# Patient Record
Sex: FEMALE | Race: WHITE | NOT HISPANIC OR LATINO | URBAN - METROPOLITAN AREA
[De-identification: names, ages, dates, MRNs, and addresses within clinical notes are randomized per-mention and may not be internally consistent; named-entity substitution may affect disease eponyms.]

---

## 2023-09-22 ENCOUNTER — TELEPHONE (OUTPATIENT)
Dept: PLASTIC SURGERY | Facility: CLINIC | Age: 18
End: 2023-09-22
Payer: COMMERCIAL

## 2023-09-22 NOTE — TELEPHONE ENCOUNTER
[facility] Call Center    Phone Message    May a detailed message be left on voicemail: yes     Reason for Call: Other: Pt called would like to schedule top surgery with Dr Hart, please call - records with [facility] in [place], [place]      Action Taken: Message routed to:  [facility] ([facility] plastic surgery    Travel Screening: Not Applicable

## 2023-10-03 ENCOUNTER — TELEPHONE (OUTPATIENT)
Dept: PLASTIC SURGERY | Facility: CLINIC | Age: 18
End: 2023-10-03
Payer: COMMERCIAL

## 2023-10-03 NOTE — CONFIDENTIAL NOTE
[facility] :  Care Coordination Note     SITUATION   Harsh Short (he/him) is a 18 year old adult who is receiving support for:  Care Team  .    BACKGROUND     Pt is scheduled for a gender affirming mastectomy consult with Dr. Munoz on 3/1/24.     He is also scheduled for a top surgery consult with Dr. Hart on 6/4/24. Jazmin will follow up after each appt.     ASSESSMENT     Surgery              CGC Assessment  Comprehensive Gender Care (CGC) Enrollment: (P) Enrolled  Patient has a therapist: (P) No  Letter of support #1: (P) Requested  Surgery being considered: (P) Yes  Mastectomy: (P) Yes    Pt reports:   No nicotine or other gender affirming surgeries  PLAN          Nursing Interventions:       Follow-up plan:  1. Establish with MH provider  2. Obtain MADHAVI Colunga

## 2024-02-12 ENCOUNTER — TELEPHONE (OUTPATIENT)
Dept: PLASTIC SURGERY | Facility: CLINIC | Age: 19
End: 2024-02-12
Payer: COMMERCIAL

## 2024-02-12 NOTE — TELEPHONE ENCOUNTER
Call placed to patient to discuss upcoming consultation with Dr. Munoz. I encouraged the patient to bring a LOS to this appointment if possible. The patient had questions regarding surgery scheduling, discussed that it is pending LOS approval and decision on who he will move forward with. Patient verbalized understanding and agreeable to plan of care.     The patient was also notified that a link for [employer] was sent as this is the easiest and best way to communicate with our team. Patient will set up account.     Lizzette Madsen RN on 2/12/2024 at 10:12 AM

## 2024-03-01 ENCOUNTER — MYC MEDICAL ADVICE (OUTPATIENT)
Dept: PLASTIC SURGERY | Facility: AMBULATORY SURGERY CENTER | Age: 19
End: 2024-03-01

## 2024-03-01 ENCOUNTER — OFFICE VISIT (OUTPATIENT)
Dept: PLASTIC SURGERY | Facility: AMBULATORY SURGERY CENTER | Age: 19
End: 2024-03-01
Payer: COMMERCIAL

## 2024-03-01 VITALS
SYSTOLIC BLOOD PRESSURE: 126 MMHG | BODY MASS INDEX: 27.31 KG/M2 | HEIGHT: 67 IN | DIASTOLIC BLOOD PRESSURE: 86 MMHG | WEIGHT: 174 LBS

## 2024-03-01 DIAGNOSIS — Z80.3 FAMILY HISTORY OF MALIGNANT NEOPLASM OF BREAST: ICD-10-CM

## 2024-03-01 DIAGNOSIS — F64.9 GENDER DYSPHORIA: Primary | ICD-10-CM

## 2024-03-01 PROCEDURE — 99203 OFFICE O/P NEW LOW 30 MIN: CPT | Performed by: PLASTIC SURGERY

## 2024-03-01 RX ORDER — IBUPROFEN 200 MG
400 TABLET ORAL EVERY 6 HOURS PRN
COMMUNITY

## 2024-03-01 RX ORDER — ACETAMINOPHEN 325 MG/1
1-2 TABLET ORAL EVERY 4 HOURS PRN
COMMUNITY

## 2024-03-01 RX ORDER — TESTOSTERONE CYPIONATE 200 MG/ML
INJECTION, SOLUTION INTRAMUSCULAR WEEKLY
COMMUNITY
Start: 2022-05-06

## 2024-03-01 RX ORDER — ESCITALOPRAM OXALATE 20 MG/1
1 TABLET ORAL DAILY
COMMUNITY
Start: 2020-05-01

## 2024-03-01 NOTE — ADDENDUM NOTE
Addended by: CESAR DOMINGUEZ on: 3/1/2024 10:44 AM     Modules accepted: Orders, Level of Service

## 2024-03-01 NOTE — PROGRESS NOTES
Patient has decided to proceed with me.    I will send case request today.    Daniel Munoz MD , FACS   Diplomate American Board of Plastic Surgery  Diplomate American Board of Surgery  Adj. Assistant Professor of Surgery  Division of Plastic & Reconstructive Surgery   [facility]  Office: [phone]   3/1/2024 at 10:28 AM

## 2024-03-01 NOTE — NURSING NOTE
Patient here today for top surgery consultation.     The patient has a LOS that they will upload once MyChart has been set up.    The patient works at [employer] and will likely need 6 weeks off work to recover.

## 2024-03-01 NOTE — PROGRESS NOTES
"March 1, 2024    Chief complaint:  Gender dysphoria, consultation for top surgery     History of present illness:  This is a 18 year old trans male who comes today for consultation regarding top surgery.  Pronouns he/him.  Patient's name is Harsh.  Patient has been taking testosterone for the last 1-1/2 years.  He has been dealing with gender dysphoria for the last 5 years.  Letter of support is ready.     Past medical history:  History reviewed. No pertinent past medical history.    Gender dysphoria     Past surgical history:  Tonsillectomy     Allergies:  No known drug allergies    Medications:    Current Outpatient Medications:     acetaminophen (TYLENOL) 325 MG tablet, Take 1-2 tablets by mouth every 4 hours as needed for pain, Disp: , Rfl:     escitalopram (LEXAPRO) 20 MG tablet, Take 1 tablet by mouth daily, Disp: , Rfl:     ibuprofen (ADVIL/MOTRIN) 200 MG tablet, Take 400 mg by mouth every 6 hours as needed for pain, Disp: , Rfl:     testosterone cypionate (DEPOTESTOSTERONE) 200 MG/ML injection, Inject into the muscle once a week, Disp: , Rfl:     Family history:  Mother with breast cancer     Social History:  Denies tobacco, drinks alcohol socially     Review of systems:  General ROS: No complaints or constitutional symptoms  Skin: No complaints or symptoms   Hematologic/Lymphatic: No symptoms or complaints  Psychiatric: Patient presents with gender dysphoria  Endocrine: No excessive fatigue, no hypermetabolic symptoms reported  Respiratory ROS: No cough, shortness of breath, or wheezing  Cardiovascular ROS: No chest pain or dyspnea on exertion  Breast ROS: Denies nipple discharge, denies palpable breast masses, denies peau d'orange.  Gastrointestinal ROS: No abdominal pain, nausea, diarrhea, or constipation  Musculoskeletal ROS: No recent injuries reported  Neurological ROS: No focal neurologic defects reported.       Physical exam:    /86   Ht 1.702 m (5' 7\")   Wt 78.9 kg (174 lb)   BMI 27.25 " "kg/m    General: Alert, cooperative, appears stated age   Skin: Skin color, texture, turgor normal, no rashes or lesions   Lymphatic: No obvious adenopathy, no swelling   Eyes: No scleral icterus, pupils equal  HENT: No traumatic injury to the head or face, no gross abnormalities  Lungs: Normal respiratory effort, breath sounds equal bilaterally  Heart: Regular rate and rhythm  Breasts: Bilateral grade 2 ptosis, no obvious nipple inversion, no obvious nipple discharge, no obvious palpable breast masses, no peau d'orange.  No palpable axillae lymphadenopathies bilaterally.  Abdomen: Soft, non-distended and non-tender to palpation  Neurologic: Grossly intact           Assessment:     18 year old trans male with history of gender dysphoria.     PLAN:      I believe that Harsh is a good candidate for gender affirming top surgery with one of the following options: Bilateral simple mastectomies versus bilateral breast reduction with possible nipple graft reconstruction.     Patient desires NO nipple reconstruction.     \"According to [place] case law and [facility] standards of care, with an appropriate letter of support from a mental health provider, top surgery/mastectomy is medically necessary for the treatment of gender dysphoria.\"     I discussed with Harsh the risks of surgery and they include but are not limited to scarring, keloid formation, infection, bleeding, hematoma, seroma, contour deformities, lack of sensation on the chest, loss of nipple areolar graft requiring future tattooing, hypopigmentation of the nipple grafting with potential need for future tattooing.     Patient did acknowledge all of these risks and is interested in top surgery.    He also has an appointment with Dr. Hart and will decide which surgeon to go to after consultation with her.     I will wait for his decision.  In the meantime, I will obtain preoperative bilateral breast ultrasound due to family history of breast cancer.     Time " spent with the patient 30 minutes.    Daniel Munoz MD , FACS   Diplomate American Board of Plastic Surgery  Diplomate American Board of Surgery  Adj. Assistant Professor of Surgery  Division of Plastic & Reconstructive Surgery   [facility]  Office: [phone]   3/1/2024 at 9:37 AM

## 2024-03-07 ENCOUNTER — ANCILLARY PROCEDURE (OUTPATIENT)
Dept: MAMMOGRAPHY | Facility: CLINIC | Age: 19
End: 2024-03-07
Attending: PLASTIC SURGERY
Payer: COMMERCIAL

## 2024-03-07 DIAGNOSIS — Z80.3 FAMILY HISTORY OF MALIGNANT NEOPLASM OF BREAST: ICD-10-CM

## 2024-03-07 PROCEDURE — 76642 ULTRASOUND BREAST LIMITED: CPT | Mod: 50

## 2024-03-10 ENCOUNTER — HEALTH MAINTENANCE LETTER (OUTPATIENT)
Age: 19
End: 2024-03-10

## 2024-03-18 ENCOUNTER — DOCUMENTATION ONLY (OUTPATIENT)
Dept: PLASTIC SURGERY | Facility: CLINIC | Age: 19
End: 2024-03-18
Payer: COMMERCIAL

## 2024-03-18 NOTE — PROGRESS NOTES
[facility] :  Care Coordination Note     SITUATION   Harsh Short (he/him) is a 18 year old adult who is receiving support for:  Care Team  .    BACKGROUND     LOS meets criteria (located in media tab 3/1). Updated pt list. Sent message to RNCC and  to schedule surgery. Sent message to PA coordinator to start PA.    ASSESSMENT     Surgery              CGC Assessment  Comprehensive Gender Care ([facility]) Enrollment: Enrolled  Patient has a therapist: No  Letter of support #1: Received  Letter #1 Date: 02/28/24  Surgery being considered: Yes  Mastectomy: Yes      PLAN          Nursing Interventions:       Follow-up plan:   will schedule surgery. PA coordinator will start PA.        KARTHIK Flor

## 2024-03-20 ENCOUNTER — MYC MEDICAL ADVICE (OUTPATIENT)
Dept: SURGERY | Facility: CLINIC | Age: 19
End: 2024-03-20
Payer: COMMERCIAL

## 2024-03-20 NOTE — LETTER
Pre-op Physical:  Schedule a pre-op physical with your primary care doctor if not internal to [facility].  If internal, we have scheduled this.   The pre-op physical must be 10-30 days before surgery and since it is required by anesthesia, your surgery will be cancelled if it's not done.      Surgery Date: 5/8/2024     [facility].    [address], [address], [place] [address]    Approximate Arrival Time: 5:45 am  (Unless instructed differently by the pre-op call nurse)     Post op Appointment: 5/15/2024 at 10:00 am with  AVNI Crocker  [facility],   [address], [place] [address].    Post op Appointment: 6/10/2024 at 12:30 pm with  Tegan Madrigal NP  [facility].    [address], [place] [address]    Pre-Surgical Tasks:     Review all medications with your primary care or prescribing physician; they will advise you which meds to stop and when, and when you can resume taking.  Certain medications like blood thinners and weight loss medications need to be stopped in advance of surgery to proceed safely.      Blood thinners including but not exclusive to drugs like Xarelto, Eliquis, Warfarin and Aspirin, should be stopped five days before surgery, if your prescribing provider agrees. Follow your provider's advice on stopping blood thinners because they know you best.  If you are unsure if your medication is a blood thinner, ask your prescribing provider.    Weight loss medications: There are multiple medications being used for weight management and diabetes today, and the list is growing.  Phentermine, Ozempic, Wegovy, Trulicity, and other similar medications need to be stopped one week before surgery to avoid being cancelled.  Victoza and Saxenda can be continued longer but must be stopped one full day before  surgery.  Please ask your prescribing provider for advice.    Diabetic medications: in addition to the medications talked about above that are used for either weight loss or diabetes, some people are on insulin that may require adjustment.  Please discuss managing diabetic medications with your prescribing doctor as these medications may require modification prior to surgery.     Please shower the evening before and morning of surgery with Hibiclens soap.  Any [facility] can provide this to you at no cost, or it can be found at your local pharmacy.     Fasting instructions will be provided by the pre-op nurse who will call you 1-3 days before surgery.  Typically, we advise normal food up to 8 hours before you arrive for surgery. Clear liquids only from then until 2 hours before you arrive surgery, then nothing at all by mouth.  The nurse will review your specific instructions with you at the call.      Smoking impacts your body's ability to heal properly so we advise patients to quit if possible before surgery.  Plastic Surgery patients are required to be nicotine free for at least 8 weeks before surgery.      You will need an adult to drive you home and stay with you 24 hours after surgery. Public transportation or Medical Van Services are not permitted.    Visitor restrictions are subject to change, please verify with the pre-op nurse when they call how many people are permitted to accompany you.    We always encourage you to notify your insurance any time you have medical tests or procedures scheduled including surgery. The number is usually right on the back of your insurance card. To obtain pricing for surgery, please call [facility] Cost of Care at [phone] or email [email or web address].        Call our office if you have any questions! Thank you!     Luz Maria Johnson MA  [facility]   (General Surgery/ ENT/ Plastics)  Direct Office:  [phone]

## 2024-04-03 PROBLEM — F64.9 GENDER DYSPHORIA: Status: ACTIVE | Noted: 2024-03-01

## 2024-04-03 NOTE — TELEPHONE ENCOUNTER
Spoke with patient today regarding surgery scheduling      Went over details/instructions.Patient will contact their external PCP for the H&P within 30 days prior to surgery    Surgery Letter sent via [facility]  (Please see LETTERS TAB in chart to retrieve a copy of this letter)

## 2024-05-07 ENCOUNTER — ANESTHESIA EVENT (OUTPATIENT)
Dept: SURGERY | Facility: AMBULATORY SURGERY CENTER | Age: 19
End: 2024-05-07
Payer: COMMERCIAL

## 2024-05-07 NOTE — ANESTHESIA PREPROCEDURE EVALUATION
"Anesthesia Pre-Procedure Evaluation    Patient: Muriel Short   MRN: [id number] : 2005        Procedure : Procedure(s):  BILATERAL MASTECTOMY, SIMPLE WITH NO NIPPLE GRAFT          No past medical history on file.   Past Surgical History:   Procedure Laterality Date    MYRINGOTOMY, INSERT TUBE BILATERAL, COMBINED      TONSILLECTOMY & ADENOIDECTOMY      WISDOM TOOTH EXTRACTION        No Known Allergies   Social History     Tobacco Use    Smoking status: Former     Current packs/day: 0.00     Types: Cigarettes, Other     Start date:      Quit date: 2019     Years since quittin.3    Smokeless tobacco: Never    Tobacco comments:     Former Vape use   Substance Use Topics    Alcohol use: Yes     Comment: 1 drink per week      Wt Readings from Last 1 Encounters:   24 78.9 kg (174 lb)           Physical Exam    Airway        Mallampati: II       Respiratory Devices and Support         Dental       (+) Minor Abnormalities - some fillings, tiny chips      Cardiovascular          Rhythm and rate: regular     Pulmonary                   OUTSIDE LABS:  CBC: No results found for: \"WBC\", \"HGB\", \"HCT\", \"PLT\"  BMP: No results found for: \"NA\", \"POTASSIUM\", \"CHLORIDE\", \"CO2\", \"BUN\", \"CR\", \"GLC\"  COAGS: No results found for: \"PTT\", \"INR\", \"FIBR\"  POC: No results found for: \"BGM\", \"HCG\", \"HCGS\"  HEPATIC: No results found for: \"ALBUMIN\", \"PROTTOTAL\", \"ALT\", \"AST\", \"GGT\", \"ALKPHOS\", \"BILITOTAL\", \"BILIDIRECT\", \"TRISTAN\"  OTHER: No results found for: \"PH\", \"LACT\", \"A1C\", \"ANIYAH\", \"PHOS\", \"MAG\", \"LIPASE\", \"AMYLASE\", \"TSH\", \"T4\", \"T3\", \"CRP\", \"SED\"    Anesthesia Plan    ASA Status:  2    NPO Status:  NPO Appropriate    Anesthesia Type: General.     - Airway: LMA   Induction: Intravenous.   Maintenance: TIVA.        Consents    Anesthesia Plan(s) and associated risks, benefits, and realistic alternatives discussed. Questions answered and patient/representative(s) expressed understanding.     - Discussed:     - " Discussed with:  Patient            Postoperative Care    Pain management: Oral pain medications, IV analgesics, Multi-modal analgesia.   PONV prophylaxis: Ondansetron (or other 5HT-3), Dexamethasone or Solumedrol     Comments:               Mac Joseph MD    I have reviewed the pertinent notes and labs in the chart from the past 30 days and (re)examined the patient.  Any updates or changes from those notes are reflected in this note.

## 2024-05-08 ENCOUNTER — HOSPITAL ENCOUNTER (OUTPATIENT)
Facility: AMBULATORY SURGERY CENTER | Age: 19
Discharge: HOME | End: 2024-05-08
Attending: PLASTIC SURGERY | Admitting: PLASTIC SURGERY
Payer: COMMERCIAL

## 2024-05-08 ENCOUNTER — ANESTHESIA (OUTPATIENT)
Dept: SURGERY | Facility: AMBULATORY SURGERY CENTER | Age: 19
End: 2024-05-08
Payer: COMMERCIAL

## 2024-05-08 VITALS
HEART RATE: 60 BPM | HEIGHT: 67 IN | BODY MASS INDEX: 25.9 KG/M2 | RESPIRATION RATE: 12 BRPM | DIASTOLIC BLOOD PRESSURE: 105 MMHG | TEMPERATURE: 98 F | OXYGEN SATURATION: 100 % | WEIGHT: 165 LBS | SYSTOLIC BLOOD PRESSURE: 145 MMHG

## 2024-05-08 DIAGNOSIS — Z90.13 S/P BILATERAL MASTECTOMY: Primary | ICD-10-CM

## 2024-05-08 PROCEDURE — 19303 MAST SIMPLE COMPLETE: CPT | Mod: 50

## 2024-05-08 PROCEDURE — 19303 MAST SIMPLE COMPLETE: CPT | Mod: 50 | Performed by: PLASTIC SURGERY

## 2024-05-08 PROCEDURE — 88305 TISSUE EXAM BY PATHOLOGIST: CPT | Mod: TC | Performed by: PLASTIC SURGERY

## 2024-05-08 PROCEDURE — 88307 TISSUE EXAM BY PATHOLOGIST: CPT | Mod: 26 | Performed by: PATHOLOGY

## 2024-05-08 PROCEDURE — 19303 MAST SIMPLE COMPLETE: CPT | Performed by: ANESTHESIOLOGY

## 2024-05-08 PROCEDURE — 19303 MAST SIMPLE COMPLETE: CPT | Performed by: NURSE ANESTHETIST, CERTIFIED REGISTERED

## 2024-05-08 RX ORDER — CEPHALEXIN 500 MG/1
500 CAPSULE ORAL 3 TIMES DAILY
Qty: 9 CAPSULE | Refills: 0 | Status: SHIPPED | OUTPATIENT
Start: 2024-05-08 | End: 2024-05-11

## 2024-05-08 RX ORDER — AMOXICILLIN 250 MG
1-2 CAPSULE ORAL 2 TIMES DAILY
Qty: 30 TABLET | Refills: 0 | Status: SHIPPED | OUTPATIENT
Start: 2024-05-08

## 2024-05-08 RX ORDER — FENTANYL CITRATE 50 UG/ML
INJECTION, SOLUTION INTRAMUSCULAR; INTRAVENOUS PRN
Status: DISCONTINUED | OUTPATIENT
Start: 2024-05-08 | End: 2024-05-08

## 2024-05-08 RX ORDER — HYDROMORPHONE HYDROCHLORIDE 1 MG/ML
0.2 INJECTION, SOLUTION INTRAMUSCULAR; INTRAVENOUS; SUBCUTANEOUS EVERY 5 MIN PRN
Status: DISCONTINUED | OUTPATIENT
Start: 2024-05-08 | End: 2024-05-09 | Stop reason: HOSPADM

## 2024-05-08 RX ORDER — LIDOCAINE 40 MG/G
CREAM TOPICAL
Status: DISCONTINUED | OUTPATIENT
Start: 2024-05-08 | End: 2024-05-09 | Stop reason: HOSPADM

## 2024-05-08 RX ORDER — LIDOCAINE HYDROCHLORIDE 20 MG/ML
INJECTION, SOLUTION INFILTRATION; PERINEURAL PRN
Status: DISCONTINUED | OUTPATIENT
Start: 2024-05-08 | End: 2024-05-08

## 2024-05-08 RX ORDER — GLYCOPYRROLATE 0.2 MG/ML
INJECTION, SOLUTION INTRAMUSCULAR; INTRAVENOUS PRN
Status: DISCONTINUED | OUTPATIENT
Start: 2024-05-08 | End: 2024-05-08

## 2024-05-08 RX ORDER — GABAPENTIN 300 MG/1
300 CAPSULE ORAL
Status: COMPLETED | OUTPATIENT
Start: 2024-05-08 | End: 2024-05-08

## 2024-05-08 RX ORDER — CEFAZOLIN SODIUM 2 G/50ML
2 SOLUTION INTRAVENOUS
Status: COMPLETED | OUTPATIENT
Start: 2024-05-08 | End: 2024-05-08

## 2024-05-08 RX ORDER — PROPOFOL 10 MG/ML
INJECTION, EMULSION INTRAVENOUS PRN
Status: DISCONTINUED | OUTPATIENT
Start: 2024-05-08 | End: 2024-05-08

## 2024-05-08 RX ORDER — ONDANSETRON 2 MG/ML
4 INJECTION INTRAMUSCULAR; INTRAVENOUS EVERY 30 MIN PRN
Status: DISCONTINUED | OUTPATIENT
Start: 2024-05-08 | End: 2024-05-09 | Stop reason: HOSPADM

## 2024-05-08 RX ORDER — ONDANSETRON 2 MG/ML
INJECTION INTRAMUSCULAR; INTRAVENOUS PRN
Status: DISCONTINUED | OUTPATIENT
Start: 2024-05-08 | End: 2024-05-08

## 2024-05-08 RX ORDER — FENTANYL CITRATE 50 UG/ML
25 INJECTION, SOLUTION INTRAMUSCULAR; INTRAVENOUS
Status: DISCONTINUED | OUTPATIENT
Start: 2024-05-08 | End: 2024-05-09 | Stop reason: HOSPADM

## 2024-05-08 RX ORDER — OXYCODONE HYDROCHLORIDE 5 MG/1
5 TABLET ORAL
Status: COMPLETED | OUTPATIENT
Start: 2024-05-08 | End: 2024-05-08

## 2024-05-08 RX ORDER — FENTANYL CITRATE 50 UG/ML
50 INJECTION, SOLUTION INTRAMUSCULAR; INTRAVENOUS EVERY 5 MIN PRN
Status: DISCONTINUED | OUTPATIENT
Start: 2024-05-08 | End: 2024-05-09 | Stop reason: HOSPADM

## 2024-05-08 RX ORDER — HYDROMORPHONE HYDROCHLORIDE 1 MG/ML
0.4 INJECTION, SOLUTION INTRAMUSCULAR; INTRAVENOUS; SUBCUTANEOUS EVERY 5 MIN PRN
Status: DISCONTINUED | OUTPATIENT
Start: 2024-05-08 | End: 2024-05-09 | Stop reason: HOSPADM

## 2024-05-08 RX ORDER — NALOXONE HYDROCHLORIDE 0.4 MG/ML
0.1 INJECTION, SOLUTION INTRAMUSCULAR; INTRAVENOUS; SUBCUTANEOUS
Status: DISCONTINUED | OUTPATIENT
Start: 2024-05-08 | End: 2024-05-09 | Stop reason: HOSPADM

## 2024-05-08 RX ORDER — DEXAMETHASONE SODIUM PHOSPHATE 10 MG/ML
4 INJECTION, SOLUTION INTRAMUSCULAR; INTRAVENOUS
Status: DISCONTINUED | OUTPATIENT
Start: 2024-05-08 | End: 2024-05-09 | Stop reason: HOSPADM

## 2024-05-08 RX ORDER — ONDANSETRON 4 MG/1
4 TABLET, ORALLY DISINTEGRATING ORAL EVERY 30 MIN PRN
Status: DISCONTINUED | OUTPATIENT
Start: 2024-05-08 | End: 2024-05-09 | Stop reason: HOSPADM

## 2024-05-08 RX ORDER — SODIUM CHLORIDE, SODIUM LACTATE, POTASSIUM CHLORIDE, CALCIUM CHLORIDE 600; 310; 30; 20 MG/100ML; MG/100ML; MG/100ML; MG/100ML
INJECTION, SOLUTION INTRAVENOUS CONTINUOUS
Status: DISCONTINUED | OUTPATIENT
Start: 2024-05-08 | End: 2024-05-09 | Stop reason: HOSPADM

## 2024-05-08 RX ORDER — SODIUM CHLORIDE, SODIUM LACTATE, POTASSIUM CHLORIDE, CALCIUM CHLORIDE 600; 310; 30; 20 MG/100ML; MG/100ML; MG/100ML; MG/100ML
INJECTION, SOLUTION INTRAVENOUS CONTINUOUS PRN
Status: DISCONTINUED | OUTPATIENT
Start: 2024-05-08 | End: 2024-05-08

## 2024-05-08 RX ORDER — ACETAMINOPHEN 325 MG/1
975 TABLET ORAL ONCE
Status: COMPLETED | OUTPATIENT
Start: 2024-05-08 | End: 2024-05-08

## 2024-05-08 RX ORDER — FENTANYL CITRATE 50 UG/ML
25 INJECTION, SOLUTION INTRAMUSCULAR; INTRAVENOUS EVERY 5 MIN PRN
Status: DISCONTINUED | OUTPATIENT
Start: 2024-05-08 | End: 2024-05-09 | Stop reason: HOSPADM

## 2024-05-08 RX ORDER — PROPOFOL 10 MG/ML
INJECTION, EMULSION INTRAVENOUS CONTINUOUS PRN
Status: DISCONTINUED | OUTPATIENT
Start: 2024-05-08 | End: 2024-05-08

## 2024-05-08 RX ORDER — HYDROCODONE BITARTRATE AND ACETAMINOPHEN 5; 325 MG/1; MG/1
1-2 TABLET ORAL EVERY 4 HOURS PRN
Qty: 30 TABLET | Refills: 0 | Status: SHIPPED | OUTPATIENT
Start: 2024-05-08

## 2024-05-08 RX ORDER — ONDANSETRON 4 MG/1
4 TABLET, ORALLY DISINTEGRATING ORAL EVERY 6 HOURS PRN
Qty: 16 TABLET | Refills: 0 | Status: SHIPPED | OUTPATIENT
Start: 2024-05-08

## 2024-05-08 RX ORDER — OXYCODONE HYDROCHLORIDE 5 MG/1
10 TABLET ORAL
Status: DISCONTINUED | OUTPATIENT
Start: 2024-05-08 | End: 2024-05-09 | Stop reason: HOSPADM

## 2024-05-08 RX ORDER — DEXAMETHASONE SODIUM PHOSPHATE 4 MG/ML
INJECTION, SOLUTION INTRA-ARTICULAR; INTRALESIONAL; INTRAMUSCULAR; INTRAVENOUS; SOFT TISSUE PRN
Status: DISCONTINUED | OUTPATIENT
Start: 2024-05-08 | End: 2024-05-08

## 2024-05-08 RX ADMIN — CEFAZOLIN SODIUM 2 G: 2 SOLUTION INTRAVENOUS at 07:13

## 2024-05-08 RX ADMIN — SODIUM CHLORIDE, SODIUM LACTATE, POTASSIUM CHLORIDE, CALCIUM CHLORIDE: 600; 310; 30; 20 INJECTION, SOLUTION INTRAVENOUS at 06:23

## 2024-05-08 RX ADMIN — FENTANYL CITRATE 100 MCG: 50 INJECTION, SOLUTION INTRAMUSCULAR; INTRAVENOUS at 07:19

## 2024-05-08 RX ADMIN — Medication 0.5 MG: at 08:31

## 2024-05-08 RX ADMIN — ONDANSETRON 4 MG: 2 INJECTION INTRAMUSCULAR; INTRAVENOUS at 09:24

## 2024-05-08 RX ADMIN — GABAPENTIN 300 MG: 300 CAPSULE ORAL at 06:27

## 2024-05-08 RX ADMIN — ACETAMINOPHEN 975 MG: 325 TABLET ORAL at 06:27

## 2024-05-08 RX ADMIN — OXYCODONE HYDROCHLORIDE 5 MG: 5 TABLET ORAL at 11:00

## 2024-05-08 RX ADMIN — PROPOFOL 150 MCG/KG/MIN: 10 INJECTION, EMULSION INTRAVENOUS at 08:57

## 2024-05-08 RX ADMIN — GLYCOPYRROLATE 0.2 MG: 0.2 INJECTION, SOLUTION INTRAMUSCULAR; INTRAVENOUS at 07:34

## 2024-05-08 RX ADMIN — DEXAMETHASONE SODIUM PHOSPHATE 4 MG: 4 INJECTION, SOLUTION INTRA-ARTICULAR; INTRALESIONAL; INTRAMUSCULAR; INTRAVENOUS; SOFT TISSUE at 07:29

## 2024-05-08 RX ADMIN — LIDOCAINE HYDROCHLORIDE 100 MG: 20 INJECTION, SOLUTION INFILTRATION; PERINEURAL at 07:29

## 2024-05-08 RX ADMIN — PROPOFOL 200 MCG/KG/MIN: 10 INJECTION, EMULSION INTRAVENOUS at 07:32

## 2024-05-08 RX ADMIN — PROPOFOL 300 MG: 10 INJECTION, EMULSION INTRAVENOUS at 07:29

## 2024-05-08 RX ADMIN — PROPOFOL 150 MCG/KG/MIN: 10 INJECTION, EMULSION INTRAVENOUS at 08:11

## 2024-05-08 NOTE — ANESTHESIA POSTPROCEDURE EVALUATION
Patient: Muriel Short    Procedure: Procedure(s):  BILATERAL MASTECTOMY, SIMPLE WITH NO NIPPLE GRAFT       Anesthesia Type:  General    Note:  Disposition: Outpatient   Postop Pain Control: Uneventful            Sign Out: Well controlled pain   PONV: No   Neuro/Psych: Uneventful            Sign Out: Acceptable/Baseline neuro status   Airway/Respiratory: Uneventful            Sign Out: Acceptable/Baseline resp. status   CV/Hemodynamics: Uneventful            Sign Out: Acceptable CV status; No obvious hypovolemia; No obvious fluid overload   Other NRE: NONE   DID A NON-ROUTINE EVENT OCCUR?            Last vitals:  Vitals Value Taken Time   /101 05/08/24 1041   Temp 36.7  C (98.1  F) 05/08/24 1030   Pulse 63 05/08/24 1043   Resp 12 05/08/24 1043   SpO2 98 % 05/08/24 1043   Vitals shown include unfiled device data.    Electronically Signed By: Mac Joseph MD  May 8, 2024  12:19 PM

## 2024-05-08 NOTE — DISCHARGE INSTRUCTIONS
"[facility]  Home Care Following Anesthesia  For 24 hours after surgery:  Get plenty of rest.  A responsible adult must stay with you for at least 24 hours after you leave the surgery center.  Do not drive or use heavy equipment.  If you have weakness or tingling, don't drive or use heavy equipment until this feeling goes away.   Do not drink alcohol.   Avoid strenuous or risky activities.  Ask for help when climbing stairs.  You may feel lightheaded.  IF so, sit for a few minutes before standing.  Have someone help you get up.   If you have nausea (feel sick to your stomach): Drink only clear liquids such as apple juice, ginger ale, broth or 7-Up.  Rest may also help.  Be sure to drink enough fluids.  Move to a regular diet as you feel able.   You may have a slight fever.  Call the doctor if your fever is over 100 F (37.7 C) (taken under the tongue) or lasts longer than 24 hours.  You may have a dry mouth, a sore throat, muscle aches or trouble sleeping. These should go away after 24 hours.  Do not make important or legal decisions.   It is recommended to avoid smoking.   If you use hormonal birth control (such as the pill, patch, ring or implants):  You will need a second form of birth control for 7 days (condoms, a diaphragm or contraceptive foam).  While in the surgery center, you received a medicine called Sugammadex.  Hormonal birth control (such as the pill, patch, ring or implants) will not work as well for a week after taking this medicine.  Today you received a Marcaine or bupivacaine block to numb the nerves near your surgery site.  This is a block using local anesthetic or \"numbing\" medication injected around the nerves to anesthetize or \"numb\" the area supplied by those nerves.  This block is injected into the muscle layer near your surgical site.  The medication may numb the location where you had surgery for 6-18 hours, but may last up to 24 hours.  If your " surgical site is an arm or leg you should be careful with your affected limb, since it is possible to injure your limb without being aware of it due to the numbing.  Until full feeling returns, you should guard against bumping or hitting your limb, and avoid extreme hot or cold temperatures on the skin.  As the block wears off, the feeling will return as a tingling or prickly sensation near your surgical site.  You will experience more discomfort from your incision as the feeling returns.  You may want to take a pain pill (a narcotic or Tylenol if this was prescribed by your surgeon) when you start to experience mild pain before the pain beccomes more severe.  If your pain medications do not control your pain you should notifiy your surgeon.    Tips for taking pain medications  To get the best pain relief possible, remember these points:  Take pain medications as directed, before pain becomes severe.  Pain medication can upset your stomach: taking it with food may help.  Constipation is a common side effect of pain medication. Drink plenty of  fluids.  Eat foods high in fiber. Take a stool softener if recommended by your doctor or pharmacist.  Do not drink alcohol, drive or operate machinery while taking pain medications.  Ask about other ways to control pain, such as with heat, ice or relaxation.    Tylenol/Acetaminophen Consumption    If you feel your pain relief is insufficient, you may take Tylenol/Acetaminophen in addition to your narcotic pain medication.   Be careful not to exceed 4,000 mg of Tylenol/Acetaminophen in a 24 hour period from all sources.  If you are taking extra strength Tylenol/acetaminophen (500 mg), the maximum dose is 8 tablets in 24 hours.  If you are taking regular strength acetaminophen (325 mg), the maximum dose is 12 tablets in 24 hours.    Call a doctor for any of the following:  Signs of infection (fever, growing tenderness at the surgery site, a large amount of drainage or bleeding,  severe pain, foul-smelling drainage, redness, swelling).  It has been over 8 to 10 hours since surgery and you are still not able to urinate (pass water).  Headache for over 24 hours.  Numbness, tingling or weakness the day after surgery (if you had spinal anesthesia).  Signs of Covid-19 infection (temperature over 100 degrees, shortness of breath, cough, loss of taste/smell, generalized body aches, persistent headache, chills, sore throat, nausea/vomiting/diarrhea)  Your doctor is:       Dr. Daniel Munoz, Plastic Surgery: [phone]               Or dial [phone] and ask for the resident on call for:  Plastics  For emergency care, call the:  [facility]:  [phone] (TTY for hearing impaired: [phone])  Tylenol 975 mg given at 630 am.  Next available [facility] at 1230 pm.

## 2024-05-08 NOTE — ANESTHESIA PROCEDURE NOTES
Airway       Patient location during procedure: OR  Staff -        CRNA: Chelsea Ordoñez APRN CRNA       Performed By: CRNA  Consent for Airway        Urgency: elective  Indications and Patient Condition       Indications for airway management: abdias-procedural       Induction type:intravenous       Mask difficulty assessment: 0 - not attempted    Final Airway Details       Final airway type: supraglottic airway    Supraglottic Airway Details        Type: LMA       Brand: I-Gel       LMA size: 4    Post intubation assessment        Placement verified by: capnometry and chest rise        Number of attempts at approach: 1       Number of other approaches attempted: 0       Secured with: tape       Ease of procedure: easy       Dentition: Intact and Unchanged

## 2024-05-08 NOTE — OP NOTE
May 8, 2024    Muriel Short    Preoperative diagnosis: gender dysphoria.     Postoperative diagnosis: same.     Procedure: Bilateral simple mastectomy with NO nipple grafting.      Surgeon: Daniel Munoz MD (no plastic surgery resident available).     Assistant: Yadira Avery CSA (certified surgical assistant).     Anesthesia: General.     Estimated blood loss: 20 mL.     Intravenous fluid: 800 mL.     Tumescent solution: 300 mL per breast for a total of 600 mL (from 1,000 mL of Lactated Ringer and 1 mg of Epinephrine).     Findings: macroscopically normal appearing breasts     Specimens: right breast 526 grams, left breast 563 grams.     Drains: two # 15 FR Panchito drains. One per side.     Complications: none.     Disposition: Patient tolerated procedure well and was extubated and transferred to recovery room awake and in stable condition.     Indication:     This is a 18 year old patient with diagnosis of gender dysphoria.  The patient met WPATH and insurance criteria for gender affirming top surgery.  Patient did NOT wish to preserve nipple areolar complexes, hence, nipple grafting was NOT offered.  Risks were explained to the patient and they include but are not limited to scarring, infection, keloid formation, hematoma, seroma, contour irregularities, guarantee permanent anesthesia at the level of bilateral mastectomy areas.  Patient acknowledged all these risks and agreed to proceed.     Description of procedure:     The patient was seen in the preoperative holding area and preoperative markings were drawn on the chest.  First, the midline was drawn, followed by drawing of the impression of the inferior edge of bilateral pectoralis major muscles on the chest's skin.  I also marked the lateral border of the pectoralis major muscle, from the anterior axillary line to the 9 o'clock position on the nipple areolar complex. I also marked the inframammary fold (IMF) bilaterally.     This is how the markings  looked like on the patient:                   Preoperative IV antibiotics were given to the patient and the patient was then brought to the operating room and was placed supine on the operating room table.  General endotracheal anesthesia was then obtained. The patient already had sequential compression devices on the lower extremities. Thighs and forelegs were supported with pillows and secured with padded safety straps.  The arms were secured to arm boards at 90 degree angles from the table using Ace wraps.  Lower manav hugger was in place.  The patient was then put into a sitting position and adjustments were made for necessary symmetry.     Then the chest and breast area was prepped and draped in a sterile surgical fashion using ChloraPrep.       After timeout, bilateral stab incisions were made with scalpel # 15 on the outer lower quadrant of each breast and tumescent solution with a Klein's infiltrating canula was infiltrated in the sub glandular/supra-aponeurotic space. This will later facilitate dissection and hemostasis.    Then, we make incision on the skin markings for the inferior edge of the pectoralis major muscle.  Dissection with electrocautery was carried down to the capsule of bilateral breasts.  Essentially, the plane for dissection was between the subcutaneous fat and the breast capsule.  This dissection continued cranially towards the clavicles until the superior border of the breast parenchyma was visualized.  With this dissection, we were able to develop the superior mastectomy flap bilaterally.    We also dissected the space from the superior border of the breast parenchyma until the inferior edge of the clavicle.  This will later facilitate tension-free closure of the superior mastectomy flap with the lower mastectomy flap.     The breast mound was elevated from the pectoralis major muscle fascia and undermined inferiorly towards the IMF, medially towards parasternal border but staying 2 cm  laterally from the midline, and finally laterally, past the lateral border of the pectoralis major muscle, incorporating the axillary tail bilaterally. Inferiorly, the IMF was obliterated and we dissected at least 2 cm below the IMF to ensure its obliteration and a more masculine look on the patient.     At this time, we pulled inferiorly both upper mastectomy flaps and marked where they overlapped on the skin surface of the inferior pole of each breast.  This marking was approximately 3 cm cranially from the IMF.    We then proceeded to make incision on this second marking and continue dissection of the breast parenchyma between the subcutaneous tissue and the breast capsule until we reach the inframammary fold bilaterally.  This dissection developed the inferior mastectomy flap.  At this time, the breast mound was completely excised from the underlying pectoralis major muscle fascia as a mastectomy and sent to pathology as a specimens.  The right breast weight 526 grams and the left breast weight 563 grams.      After this first resection, our incisions were temporarily closed with skin staples.  The patient was put into a sitting position.  This allowed us to make further adjustment with regard to the level of our incisions as well as the contour and shape of the chest wall.  We then resected all the markings and the extra tissue that wound gain us better symmetry.  When we were satisfied with the contour, we then irrigated the mastectomy pockets and achieved excellent hemostasis with electrocautery.     A #15 Panchito drains were then introduced through separate stab incisions laterally and secured with 2-0 silk suture. The mastectomy wounds were then closed in layers with 3-0 Monocryl deep dermal buried interrupted stitches, followed by 4-0 V-Loc running subcuticular stitches.       Bacitracin ointment and Xeroform extra gauze was used to seal the incisions.  The Panchito drains were trimmed and put to bulb suction.   Dressings of Kerlix rolls were unfurled across the anterior chest for padding before wrapping the chest circumferentially with a double long 8 inch Ace wrap for compression.  Instrument count was reported by nursing personnel as correct, patient tolerated procedure well and was extubated and transferred to recovery room awake in stable condition.    Daniel Munoz MD , FACS   Diplomate American Board of Plastic Surgery  Diplomate American Board of Surgery  Adj. Assistant Professor of Surgery  Division of Plastic & Reconstructive Surgery   [facility]  Office: [phone]   5/8/2024 at 11:02 AM

## 2024-05-08 NOTE — ANESTHESIA CARE TRANSFER NOTE
Patient: Muriel Short    Procedure: Procedure(s):  BILATERAL MASTECTOMY, SIMPLE WITH NO NIPPLE GRAFT       Diagnosis: Gender dysphoria [F64.9]  Diagnosis Additional Information: No value filed.    Anesthesia Type:   General     Note:    Oropharynx: oropharynx clear of all foreign objects and spontaneously breathing  Level of Consciousness: drowsy and awake  Oxygen Supplementation: face mask  Level of Supplemental Oxygen (L/min / FiO2): 6  Independent Airway: airway patency satisfactory and stable  Dentition: dentition unchanged  Vital Signs Stable: post-procedure vital signs reviewed and stable  Report to RN Given: handoff report given  Patient transferred to: PACU    Handoff Report: Identifed the Patient, Identified the Reponsible Provider, Reviewed the pertinent medical history, Discussed the surgical course, Reviewed Intra-OP anesthesia mangement and issues during anesthesia, Set expectations for post-procedure period and Allowed opportunity for questions and acknowledgement of understanding    Vitals:  Vitals Value Taken Time   BP     Temp     Pulse 61 05/08/24 1022   Resp 32 05/08/24 1022   SpO2 98 % 05/08/24 1022   Vitals shown include unfiled device data.    Electronically Signed By: SUPA Kang CRNA  May 8, 2024  10:24 AM

## 2024-05-15 LAB
PATH REPORT.COMMENTS IMP SPEC: NORMAL
PATH REPORT.COMMENTS IMP SPEC: NORMAL
PATH REPORT.FINAL DX SPEC: NORMAL
PATH REPORT.GROSS SPEC: NORMAL
PATH REPORT.MICROSCOPIC SPEC OTHER STN: NORMAL
PATH REPORT.RELEVANT HX SPEC: NORMAL
PHOTO IMAGE: NORMAL

## 2024-05-17 ENCOUNTER — OFFICE VISIT (OUTPATIENT)
Dept: PLASTIC SURGERY | Facility: CLINIC | Age: 19
End: 2024-05-17
Payer: COMMERCIAL

## 2024-05-17 VITALS
DIASTOLIC BLOOD PRESSURE: 78 MMHG | OXYGEN SATURATION: 99 % | TEMPERATURE: 98.1 F | SYSTOLIC BLOOD PRESSURE: 131 MMHG | HEIGHT: 67 IN | HEART RATE: 84 BPM | BODY MASS INDEX: 26.68 KG/M2 | WEIGHT: 170 LBS

## 2024-05-17 DIAGNOSIS — Z90.13 STATUS POST MASTECTOMY, BILATERAL: Primary | ICD-10-CM

## 2024-05-17 PROCEDURE — 99024 POSTOP FOLLOW-UP VISIT: CPT | Performed by: NURSE PRACTITIONER

## 2024-05-17 ASSESSMENT — PAIN SCALES - GENERAL: PAINLEVEL: NO PAIN (0)

## 2024-05-17 NOTE — NURSING NOTE
"Chief Complaint   Patient presents with    HOLLI House, is being seen today for a S/P Bialteral Mastectomy 5/8/24       Vitals:    05/17/24 1345   BP: 131/78   BP Location: Left arm   Patient Position: Chair   Cuff Size: Adult Regular   Pulse: 84   Temp: 98.1  F (36.7  C)   TempSrc: Oral   SpO2: 99%   Weight: 77.1 kg (170 lb)   Height: 1.702 m (5' 7\")       Body mass index is 26.63 kg/m .      Anni Gtz LPN    "

## 2024-05-17 NOTE — LETTER
5/17/2024       RE: Muriel Short  [id number] [facility] [address]       Dear Colleague,    Thank you for referring your patient, Muriel Short, to the [facility] [place] [facility]. Please see a copy of my visit note below.    Subjective:  Harsh is a 18 year old patient status post gender affirming bilateral double incision mastectomy with no nipple grafting. This was completed by Dr Munoz on 5/8/2024. They are 9 days out from surgery and feeling well.     Objective:  General: NAD  Chest:   Dressings were removed, bilateral christian drains have been removed. Incisions c/d/I without wounds or drainage. Patient presents with excellent symmetry on bilateral incision mastectomies. No evidence of infection or dehiscence or late seroma or hematoma.     Assessment/Plan:   S/P bilateral mastectomy  Begin applying compression garment and cococa butter lotion over the incision. Avoid heavy lifting for the next 5 weeks and return as scheduled in 4 weeks to see Dr Munoz for your next post-op visit.         Again, thank you for allowing me to participate in the care of your patient.      Sincerely,    SUPA Ahn CNP

## 2024-05-17 NOTE — PATIENT INSTRUCTIONS
Farzad House,    It was greats to see you at your first post-op appointment today! As a reminder here are the post-op instructions we discussed, please read through them and reach out should you have any questions or concerns.     You can take a shower today! Please make sure someone is home with you during your first shower in case you feel weak or lightheaded. You can remove all dressings prior to showering.   If you have nipple grafts please do not let the shower water hit you from the front for the next 7 days. Please shower with your back to the stream of water. It is okay for the grafts to get wet, but we do not want direct water pressure to the grafts as it can detach them if too strong.   If you DO NOT have nipple grafts, please shower as you normally would.     After your shower you will need to pat the surgical area dry and apply antibiotic ointment to the surgical site, nipple grafts (if you have them) and drain sites. The drain sites will also need a piece of gauze and tape to hold the gauze in place for the next 3 days because they can ooze after they have been removed and while they are healing. Please continue to use antibiotic ointment for an additional 4 days. Once the week of antibiotic ointment is up, you will switch to cocoa butter lotion.     Cocoa butter lotion will need to be massaged around the entire surgical site 3 times per day for the next 3-6 months. This will help with scarring and diminish the appearance of your suture lines. It is very important to massage this lotion in when you are applying it as the massage aspect is key to the improvement of scar appearance.     You are now able to wear Deoderant, please note it can be tender under and around your armpit area. Please apply gently.     You will need to wear a compression wrap 24/7 unless showering until cleared by Dr. Munoz. We like the wrap from the company [employer] in the style [id number]. It can be purchased using the  URL: https://[employer]/product/[email or web address]-male-body-[email or web address]     As a reminder you have a 10 lbs weight restriction until you hit 6 weeks post. So even though you may be feeling much better, you need to follow the post-op restrictions to prevent any complications. We would like to remind you not to lift, push, pull or carry anything greater than 10lbs or reach overhead during the healing period. Once you are at the 6 week post-op you can return to normal activities.     Again, we are here should you need anything! Do not hesitate to reach out! Wishing you a continued speedy recovery! We look forward to seeing you at your next scheduled post-op visit.     Tegan Reddy APRN CNP on 5/17/2024 at 1:54 PM

## 2024-05-17 NOTE — PROGRESS NOTES
Subjective:  Harsh is a 18 year old patient status post gender affirming bilateral double incision mastectomy with no nipple grafting. This was completed by Dr Munoz on 5/8/2024. They are 9 days out from surgery and feeling well.     Objective:  General: NAD  Chest:   Dressings were removed, bilateral christian drains have been removed. Incisions c/d/I without wounds or drainage. Patient presents with excellent symmetry on bilateral incision mastectomies. No evidence of infection or dehiscence or late seroma or hematoma.     Assessment/Plan:   S/P bilateral mastectomy  Begin applying compression garment and cococa butter lotion over the incision. Avoid heavy lifting for the next 5 weeks and return as scheduled in 4 weeks to see Dr Munoz for your next post-op visit.     SUPA Ahn CNP on 5/17/2024 at 1:52 PM

## 2024-05-23 ENCOUNTER — MYC MEDICAL ADVICE (OUTPATIENT)
Dept: PLASTIC SURGERY | Facility: AMBULATORY SURGERY CENTER | Age: 19
End: 2024-05-23
Payer: COMMERCIAL

## 2024-05-23 NOTE — TELEPHONE ENCOUNTER
FMLA / MAURA or Short Term Disability Paperwork completed and signed on providers behalf.     Leave start date: 05/08/2024     Leave end date: 06/19/2024    RTW on 06/19/2024 with no restrictions.     Forms faxed to: Dale, at: [phone].    Forms labeled and sent to HIM for scanning. Copy e-mailed to [email or web address].    Lizzette Madsen RN on 5/23/2024 at 4:31 PM

## 2024-06-21 ENCOUNTER — OFFICE VISIT (OUTPATIENT)
Dept: PLASTIC SURGERY | Facility: AMBULATORY SURGERY CENTER | Age: 19
End: 2024-06-21
Payer: COMMERCIAL

## 2024-06-21 DIAGNOSIS — Z90.13 STATUS POST BILATERAL MASTECTOMY: Primary | ICD-10-CM

## 2024-06-21 PROCEDURE — 99024 POSTOP FOLLOW-UP VISIT: CPT | Performed by: PLASTIC SURGERY

## 2024-06-21 NOTE — PROGRESS NOTES
Harsh is a 19 years old patient status post bilateral mastectomies with no nipple grafting.  Patient is almost 6 weeks out from surgery.  Patient is doing well.    At the physical exam, all incisions are well-healed.  Good shape and definition bilaterally.  No sign of keloids.                  Plan: Patient is happy with results.  Resume all normal activities.  Follow-up as needed.    Daniel Munoz MD , FACS   Diplomate American Board of Plastic Surgery  Diplomate American Board of Surgery  Adj. Assistant Professor of Surgery  Division of Plastic & Reconstructive Surgery   [facility]  Office: [phone]   6/21/2024 at 10:08 AM

## 2025-03-16 ENCOUNTER — HEALTH MAINTENANCE LETTER (OUTPATIENT)
Age: 20
End: 2025-03-16

## (undated) DEVICE — CATH TRAY FOLEY SURESTEP 16FR W/DRAIN BAG LF A300416A

## (undated) DEVICE — PREP CHLORAPREP 26ML TINTED HI-LITE ORANGE 930815

## (undated) DEVICE — SOL RINGERS LACTATED 1000ML BAG 2B2324X

## (undated) DEVICE — BNDG ELASTIC 6"X5YDS UNSTERILE 6611-60

## (undated) DEVICE — KLEIN INFILTRATION SET SINGLE SPIKE ITS-10

## (undated) DEVICE — SU MONOCRYL 4-0 PS-2 27" UND Y426H

## (undated) DEVICE — UNIVERSAL DRAPE PACK 29118

## (undated) DEVICE — GLOVE BIOGEL PI MICRO INDICATOR UNDERGLOVE SZ 7.0 48970

## (undated) DEVICE — GLOVE BIOGEL PI MICRO SZ 6.5 48565

## (undated) DEVICE — DRSG XEROFORM 5X9" 8884431605

## (undated) DEVICE — LINEN TOWEL PACK X5 5464

## (undated) DEVICE — DRAIN JACKSON PRATT CHANNEL 15FR ROUND HUBLESS SIL JP-2228

## (undated) DEVICE — DRAIN JACKSON PRATT RESERVOIR 100ML SU130-1305

## (undated) DEVICE — ESU CLEANER TIP 31142717

## (undated) DEVICE — ESU PENCIL SMOKE EVAC W/ROCKER SWITCH 0703-047-000

## (undated) DEVICE — DRSG ABDOMINAL 07 1/2X8" 7197D

## (undated) DEVICE — OINTMENT ANTIBIOTIC BACITRACIN ZINC .9 G 1171

## (undated) DEVICE — GLOVE BIOGEL PI MICRO INDICATOR UNDERGLOVE SZ 8.0 48980

## (undated) DEVICE — BNDG ELASTIC 6" DBL LENGTH UNSTERILE 6611-16

## (undated) DEVICE — PAD CHUX UNDERPAD 30X36" P3036C

## (undated) DEVICE — DRAPE IOBAN INCISE 23X17" 6650EZ

## (undated) DEVICE — SOL NACL 0.9% IRRIG 500ML BOTTLE 2F7123

## (undated) DEVICE — SUCTION MANIFOLD NEPTUNE 2 SYS 1 PORT 702-025-000

## (undated) DEVICE — STPL SKIN 35W ROTATING HEAD PRW35

## (undated) DEVICE — ESU ELEC BLADE HEX-LOCKING 2.5" E1450X

## (undated) DEVICE — BLADE KNIFE SURG 15 371115

## (undated) DEVICE — SU WND CLOSURE VLOC 90 ABS 4-0 18" P-12 VLOCM0023

## (undated) DEVICE — SU MONOCRYL 3-0 SH 27" UND Y416H

## (undated) DEVICE — ESU GROUND PAD ADULT W/CORD E7507

## (undated) DEVICE — PEN MARKING SKIN W/PAPER RULER 31145785

## (undated) DEVICE — SU SILK 2-0 SH 30" K833H

## (undated) DEVICE — GOWN LG DISP 9515

## (undated) DEVICE — GLOVE GAMMEX NEOPRENE ULTRA SZ 7.5 LF 8515

## (undated) DEVICE — STRAP KNEE/BODY 31143004

## (undated) DEVICE — PACK MINOR CUSTOM ASC

## (undated) DEVICE — SUCTION TIP YANKAUER W/O VENT K86

## (undated) DEVICE — DRSG KERLIX 4 1/2"X4YDS ROLL 6730

## (undated) DEVICE — SPONGE LAP 18X18" X8435

## (undated) RX ORDER — PROPOFOL 10 MG/ML
INJECTION, EMULSION INTRAVENOUS
Status: DISPENSED
Start: 2024-05-08

## (undated) RX ORDER — DEXMEDETOMIDINE HYDROCHLORIDE 4 UG/ML
INJECTION, SOLUTION INTRAVENOUS
Status: DISPENSED
Start: 2024-05-08

## (undated) RX ORDER — TRANEXAMIC ACID 100 MG/ML
INJECTION, SOLUTION INTRAVENOUS
Status: DISPENSED
Start: 2024-05-08

## (undated) RX ORDER — GLYCOPYRROLATE 0.2 MG/ML
INJECTION INTRAMUSCULAR; INTRAVENOUS
Status: DISPENSED
Start: 2024-05-08

## (undated) RX ORDER — EPINEPHRINE 1 MG/ML
INJECTION, SOLUTION INTRAMUSCULAR; SUBCUTANEOUS
Status: DISPENSED
Start: 2024-05-08

## (undated) RX ORDER — ACETAMINOPHEN 325 MG/1
TABLET ORAL
Status: DISPENSED
Start: 2024-05-08

## (undated) RX ORDER — ONDANSETRON 2 MG/ML
INJECTION INTRAMUSCULAR; INTRAVENOUS
Status: DISPENSED
Start: 2024-05-08

## (undated) RX ORDER — GABAPENTIN 300 MG/1
CAPSULE ORAL
Status: DISPENSED
Start: 2024-05-08

## (undated) RX ORDER — HYDROMORPHONE HYDROCHLORIDE 1 MG/ML
INJECTION, SOLUTION INTRAMUSCULAR; INTRAVENOUS; SUBCUTANEOUS
Status: DISPENSED
Start: 2024-05-08

## (undated) RX ORDER — CEFAZOLIN SODIUM 1 G/3ML
INJECTION, POWDER, FOR SOLUTION INTRAMUSCULAR; INTRAVENOUS
Status: DISPENSED
Start: 2024-05-08

## (undated) RX ORDER — CEFAZOLIN SODIUM 2 G/50ML
SOLUTION INTRAVENOUS
Status: DISPENSED
Start: 2024-05-08

## (undated) RX ORDER — OXYCODONE HYDROCHLORIDE 5 MG/1
TABLET ORAL
Status: DISPENSED
Start: 2024-05-08

## (undated) RX ORDER — DEXAMETHASONE SODIUM PHOSPHATE 4 MG/ML
INJECTION, SOLUTION INTRA-ARTICULAR; INTRALESIONAL; INTRAMUSCULAR; INTRAVENOUS; SOFT TISSUE
Status: DISPENSED
Start: 2024-05-08

## (undated) RX ORDER — FENTANYL CITRATE 50 UG/ML
INJECTION, SOLUTION INTRAMUSCULAR; INTRAVENOUS
Status: DISPENSED
Start: 2024-05-08